# Patient Record
Sex: MALE | Race: OTHER | NOT HISPANIC OR LATINO | URBAN - METROPOLITAN AREA
[De-identification: names, ages, dates, MRNs, and addresses within clinical notes are randomized per-mention and may not be internally consistent; named-entity substitution may affect disease eponyms.]

---

## 2021-02-23 ENCOUNTER — EMERGENCY (EMERGENCY)
Facility: HOSPITAL | Age: 26
LOS: 1 days | Discharge: HOME | End: 2021-02-23
Attending: EMERGENCY MEDICINE | Admitting: EMERGENCY MEDICINE
Payer: MEDICAID

## 2021-02-23 VITALS
HEART RATE: 88 BPM | RESPIRATION RATE: 16 BRPM | OXYGEN SATURATION: 100 % | TEMPERATURE: 98 F | SYSTOLIC BLOOD PRESSURE: 117 MMHG | DIASTOLIC BLOOD PRESSURE: 85 MMHG

## 2021-02-23 PROCEDURE — 99282 EMERGENCY DEPT VISIT SF MDM: CPT

## 2021-02-23 RX ORDER — IBUPROFEN 200 MG
1 TABLET ORAL
Qty: 28 | Refills: 0
Start: 2021-02-23 | End: 2021-03-01

## 2021-02-23 RX ORDER — IBUPROFEN 200 MG
400 TABLET ORAL ONCE
Refills: 0 | Status: COMPLETED | OUTPATIENT
Start: 2021-02-23 | End: 2021-02-23

## 2021-02-23 RX ORDER — OXYCODONE HYDROCHLORIDE 5 MG/1
1 TABLET ORAL
Qty: 12 | Refills: 0
Start: 2021-02-23 | End: 2021-02-25

## 2021-02-23 RX ADMIN — Medication 400 MILLIGRAM(S): at 17:48

## 2021-02-23 NOTE — ED PROVIDER NOTE - NS ED ROS FT
Gen: Denies fever.   HEENT: Denies headache. Denies congestion. + dental pain  CV: Denies chest pain. Denies lightheadedness.  Skin: Denies rash.   Resp: Denies SOB. Denies cough.  GI: Denies abd pain. Denies nausea. Denies vomiting. Denies diarrhea. Denies melena. Denies hematochezia.  Msk: Denies extremity swelling. Denies extremity pain.  : Denies dysuria. Denies hematuria.  Neuro: Denies LOC. Denies dizziness. Denies new numbness/tingling. Denies new focal weakness.  Psych: Denies SI

## 2021-02-23 NOTE — ED PROVIDER NOTE - CLINICAL SUMMARY MEDICAL DECISION MAKING FREE TEXT BOX
26 yo w/ no pertinent PMH, presenting after tooth (#32) fracture 3 mo ago, now w/ right lower dental pain x 1 day. No signs of infection. Exam as above. Low concern for infxn. Plan for analgesia, and dental follow up. Will reassess.

## 2021-02-23 NOTE — ED PROVIDER NOTE - PROGRESS NOTE DETAILS
David Hussein PGY1: Pt states that their symptoms have improved with treatment, and they are comfortable with returning home with dentistry follow-up. Provided with prescriptions for pain management, and instructions on med use. Pt understands plan, and has been provided with return precautions, and understands reasons to return to the ER.

## 2021-02-23 NOTE — ED PROVIDER NOTE - NSFOLLOWUPINSTRUCTIONS_ED_ALL_ED_FT
1. You presented to the emergency department for:  right dental pain    2. Your evaluation in the emergency department included a physician evaluation. Your evaluation did not reveal any findings indicating the need for admission to the hospital or an emergent intervention at this time.    3. It is recommended that you follow-up with a dentist within 5-7 days as discussed for a repeat evaluation, and potentially further testing and treatment. The contact information to arrange an appointment with a [facility] dentist is available in your paper work.    If needed, to arrange an appointment with a primary care provider please call: [phone]    4. We are providing you with a prescription for ibuprofen which you may  at your pharmacy. Please read instructions and precautions before taking this medication.     5. PLEASE RETURN TO THE EMERGENCY DEPARTMENT IMMEDIATELY IF you have any fevers, difficulty breathing, difficulty swallowing, uncontrollable nausea and vomiting, lightheadedness, inability to tolerate eating and drinking, difficulty breathing, severe increase in symptoms or pain, or an other new symptoms that concern you. 1. You presented to the emergency department for:  right dental pain    2. Your evaluation in the emergency department included a physician evaluation. Your evaluation did not reveal any findings indicating the need for admission to the hospital or an emergent intervention at this time.    3. It is recommended that you follow-up with a dentist within 5-7 days as discussed for a repeat evaluation, and potentially further testing and treatment. The contact information to arrange an appointment with a [facility] dentist is available in your paper work.    If needed, to arrange an appointment with a primary care provider please call: [phone]    4. We are providing you with a prescription for ibuprofen and oxycodone which you may  at your pharmacy. Please only take oxycodone for severe pain that does not respond to ibuprofen. Please read instructions and precautions before taking either of these medications.     5. PLEASE RETURN TO THE EMERGENCY DEPARTMENT IMMEDIATELY IF you have any fevers, difficulty breathing, difficulty swallowing, uncontrollable nausea and vomiting, lightheadedness, inability to tolerate eating and drinking, difficulty breathing, severe increase in symptoms or pain, or an other new symptoms that concern you.

## 2021-02-23 NOTE — ED PROVIDER NOTE - ATTENDING CONTRIBUTION TO CARE
DR. HIGGINS, ATTENDING MD-  I performed a face to face bedside interview with the patient regarding history of present illness, review of symptoms and past medical history. I completed an independent physical exam.  I have discussed the patient's plan of care with the resident.   Documentation as above in the note.    24 y/o male with fx to right lower molar few months ago with worsening pain over past few days.  No signs of gingival abscess.  Will tx pain, give dental referral, pt amenable to plan.

## 2021-02-23 NOTE — ED PROVIDER NOTE - NSFOLLOWUPCLINICS_GEN_ALL_ED_FT
[facility]  Dental  [address] [place] [id number]  Phone: [phone]  Fax:   Follow Up Time:

## 2021-02-23 NOTE — ED ADULT TRIAGE NOTE - CHIEF COMPLAINT QUOTE
Pt presents to ED ambulatory from home with c/o R sided dental pain x 2 days. Pt states pain started at approx 12pm yesterday. Pt denies previous hx.

## 2021-02-23 NOTE — ED PROVIDER NOTE - PHYSICAL EXAMINATION
Gen: A&Ox4.   HEENT: Tenderness to palaption of tooth 32, w/ class 3 neri fracture involving 50% of tooth laterally. Not avulsed. No fluctuance at base of tooth. No external swelling or erythema of cheek appreciated. Atraumatic. No pharyngeal erythema or exudates. Mucous membranes moist, no scleral icterus. EOMI intact w/o pain. No neck swelling.   CV: RRR. No M/R/G. No significant LE edema. Distal pulses intact. Capillary refill < 2 seconds.  Resp: Normal effort. CTAB, no rales, rhonchi, or wheezes. No stridor.  MSK: No open wounds. No ecchymosis appreciated.  Neuro: Following commands. Moving extremities spontaneously.  Psych: Appropriate mood, cooperative

## 2021-02-23 NOTE — ED PROVIDER NOTE - OBJECTIVE STATEMENT
24 yo w/ no pertinent PMH, presenting for right lower dental pain x 1 day. States that he broke his tooth ~3 mo ago, he does not recall how. Denies fevers, chills, nv, HA, pain w/ EOM, changes in vision, difficulty swallowing, difficulty breathing. Denies allergies to meds. States he plans to see dentistry within next few days. Denies past dental surgeries.

## 2021-04-26 NOTE — ED PROVIDER NOTE - PATIENT PORTAL LINK FT
04/26/21 1207   Team Members   Responding MD Wan   STAT RN Reva Ferrer   STAT RT Cee Manuel   Primary RN Jennie Olea   Rapid Response/ Neuro Alert   Alert Type Rapid Response / STAT;Neuro Alert / Stroke   Significant Events just finished working with PT and had loss of vision   Location / Unit at time of call 449   Team Arrival 1208   End Time 1229   Primary Reason for Call Suspected Stroke, signs and symptoms   Call initiated by Team Member   Interventions During Call CT Head;Consult   Labs Drawn During Call CBC;BMP;Metered Blood Glucose  (EQ=253)   Recommendations/ Outcomes Transferred  (transferred to )   Admission Source Inpatient   Stroke Last Known Well Date and Time   Last Known Well Date 04/26/21   Last Known Well Time 1150   Vitals   Temp 97.6 °F (36.4 °C)   Temp src Oral   Heart Rate 60   Heart Rate Source Monitor   Resp 20   SpO2 99 %   /57   MAP (mmHg) 77   BP Location RUE - Right upper extremity   BP Method Automatic   Patient Position Sitting   Activity Response No abnormal symptoms      You can access the FollowMyHealth Patient Portal offered by [facility] by registering at the following website: [email or web address]. By joining [employer]’s FollowMyHealth portal, you will also be able to view your health information using other applications (apps) compatible with our system.

## 2024-03-10 NOTE — ED ADULT NURSE NOTE - OBJECTIVE STATEMENT
Lunch cov: received to intake 10A, c.o. dental pain rt teeth x 2 days. no difficulty breathing, swallowing or swelling noted. medicated as ordered Previously Declined (within the last year)